# Patient Record
Sex: FEMALE | Race: WHITE | Employment: UNEMPLOYED | URBAN - METROPOLITAN AREA
[De-identification: names, ages, dates, MRNs, and addresses within clinical notes are randomized per-mention and may not be internally consistent; named-entity substitution may affect disease eponyms.]

---

## 2021-11-17 ENCOUNTER — TELEPHONE (OUTPATIENT)
Dept: FAMILY MEDICINE CLINIC | Facility: CLINIC | Age: 36
End: 2021-11-17

## 2021-11-17 ENCOUNTER — OFFICE VISIT (OUTPATIENT)
Dept: FAMILY MEDICINE CLINIC | Facility: CLINIC | Age: 36
End: 2021-11-17

## 2021-11-17 VITALS
HEIGHT: 64 IN | SYSTOLIC BLOOD PRESSURE: 100 MMHG | HEART RATE: 76 BPM | WEIGHT: 133.5 LBS | DIASTOLIC BLOOD PRESSURE: 74 MMHG | RESPIRATION RATE: 16 BRPM | BODY MASS INDEX: 22.79 KG/M2

## 2021-11-17 DIAGNOSIS — Z13.29 SCREENING FOR THYROID DISORDER: ICD-10-CM

## 2021-11-17 DIAGNOSIS — Z13.220 SCREENING FOR LIPID DISORDERS: ICD-10-CM

## 2021-11-17 DIAGNOSIS — Z13.0 SCREENING FOR BLOOD DISEASE: Primary | ICD-10-CM

## 2021-11-17 DIAGNOSIS — Z13.228 SCREENING FOR METABOLIC DISORDER: ICD-10-CM

## 2021-11-17 DIAGNOSIS — L30.9 ECZEMA, UNSPECIFIED TYPE: Primary | ICD-10-CM

## 2021-11-17 PROCEDURE — 3074F SYST BP LT 130 MM HG: CPT | Performed by: NURSE PRACTITIONER

## 2021-11-17 PROCEDURE — 3008F BODY MASS INDEX DOCD: CPT | Performed by: NURSE PRACTITIONER

## 2021-11-17 PROCEDURE — 99203 OFFICE O/P NEW LOW 30 MIN: CPT | Performed by: NURSE PRACTITIONER

## 2021-11-17 PROCEDURE — 3078F DIAST BP <80 MM HG: CPT | Performed by: NURSE PRACTITIONER

## 2021-11-17 RX ORDER — LEVONORGESTREL 52 MG/1
INTRAUTERINE DEVICE INTRAUTERINE
COMMUNITY
Start: 2019-06-26

## 2021-11-17 RX ORDER — VALACYCLOVIR HYDROCHLORIDE 1 G/1
TABLET, FILM COATED ORAL
COMMUNITY
Start: 2019-07-23

## 2021-11-17 NOTE — TELEPHONE ENCOUNTER
Please enter lab orders for the patient's upcoming physical appointment. Physical scheduled:    Your appointments     Date & Time Appointment Department [facility])    Jan 04, 2022 11:00 AM CST Physical - Established with Delio Downs MD [address] [place] [address]

## 2021-11-17 NOTE — PROGRESS NOTES
Georgiana Berrios is a 31year old female who presents as a new patient to establish:       Patient complains of several month history of red, itchy rash to hands, with some serous, sticky drainage. Seems to be spreading.  Denies fever/chills, swelling or pu nasal congestion, sinus pain or tenderness  EYES: denies blurred/double vision or other vision changes, denies discharge from eyes  LUNGS: denies shortness of breath with exertion  CARDIOVASCULAR: denies chest pain on exertion  GI: denies abdominal pain,de

## 2021-11-23 ENCOUNTER — TELEPHONE (OUTPATIENT)
Dept: FAMILY MEDICINE CLINIC | Facility: CLINIC | Age: 36
End: 2021-11-23

## 2021-11-23 NOTE — TELEPHONE ENCOUNTER
Patient completed medical records request form to obtain medical records (pap results, lab results) from previous provider [facility].  Release faxed to [facility] at [phone]

## 2021-12-07 NOTE — TELEPHONE ENCOUNTER
Received medical records from [facility], placed in [address] [place] [address] in box

## 2022-01-04 ENCOUNTER — E-VISIT (OUTPATIENT)
Dept: TELEHEALTH | Age: 37
End: 2022-01-04

## 2022-01-04 DIAGNOSIS — Z20.822 ENCOUNTER BY TELEHEALTH FOR SUSPECTED COVID-19: Primary | ICD-10-CM

## 2022-01-04 PROCEDURE — 99998 NO SHOW: CPT | Performed by: NURSE PRACTITIONER

## 2022-01-04 PROCEDURE — 99421 OL DIG E/M SVC 5-10 MIN: CPT | Performed by: NURSE PRACTITIONER

## 2022-01-04 NOTE — PROGRESS NOTES
Patient elected an E-Visit. After reviewing history, symptoms and ordering lab testing, (7) minutes of time was accrued. Please see E-Visit for further information.

## 2022-03-07 ENCOUNTER — OFFICE VISIT (OUTPATIENT)
Dept: FAMILY MEDICINE CLINIC | Facility: CLINIC | Age: 37
End: 2022-03-07
Payer: COMMERCIAL

## 2022-03-07 VITALS
DIASTOLIC BLOOD PRESSURE: 70 MMHG | BODY MASS INDEX: 23.59 KG/M2 | HEIGHT: 63.5 IN | WEIGHT: 134.81 LBS | HEART RATE: 80 BPM | RESPIRATION RATE: 16 BRPM | SYSTOLIC BLOOD PRESSURE: 102 MMHG | TEMPERATURE: 98 F

## 2022-03-07 DIAGNOSIS — Z11.51 SCREENING FOR HPV (HUMAN PAPILLOMAVIRUS): ICD-10-CM

## 2022-03-07 DIAGNOSIS — Z00.00 ROUTINE GENERAL MEDICAL EXAMINATION AT A HEALTH CARE FACILITY: Primary | ICD-10-CM

## 2022-03-07 DIAGNOSIS — Z12.4 SCREENING FOR CERVICAL CANCER: ICD-10-CM

## 2022-03-07 DIAGNOSIS — L70.0 ACNE VULGARIS: ICD-10-CM

## 2022-03-07 PROCEDURE — 87624 HPV HI-RISK TYP POOLED RSLT: CPT | Performed by: FAMILY MEDICINE

## 2022-03-07 PROCEDURE — 3008F BODY MASS INDEX DOCD: CPT | Performed by: FAMILY MEDICINE

## 2022-03-07 PROCEDURE — 99395 PREV VISIT EST AGE 18-39: CPT | Performed by: FAMILY MEDICINE

## 2022-03-07 PROCEDURE — 3074F SYST BP LT 130 MM HG: CPT | Performed by: FAMILY MEDICINE

## 2022-03-07 PROCEDURE — 3078F DIAST BP <80 MM HG: CPT | Performed by: FAMILY MEDICINE

## 2022-03-07 PROCEDURE — 88175 CYTOPATH C/V AUTO FLUID REDO: CPT | Performed by: FAMILY MEDICINE

## 2022-03-07 RX ORDER — SPIRONOLACTONE 25 MG/1
25 TABLET ORAL DAILY
Qty: 90 TABLET | Refills: 0 | Status: SHIPPED | OUTPATIENT
Start: 2022-03-07

## 2022-03-07 RX ORDER — AZELAIC ACID 0.15 G/G
GEL TOPICAL
Qty: 50 G | Refills: 3 | Status: SHIPPED | OUTPATIENT
Start: 2022-03-07

## 2022-03-07 RX ORDER — CLINDAMYCIN PHOSPHATE 10 MG/G
GEL TOPICAL
Qty: 45 G | Refills: 3 | Status: SHIPPED | OUTPATIENT
Start: 2022-03-07

## 2022-03-08 LAB — HPV I/H RISK 1 DNA SPEC QL NAA+PROBE: NEGATIVE

## 2022-06-03 ENCOUNTER — LAB ENCOUNTER (OUTPATIENT)
Dept: LAB | Age: 37
End: 2022-06-03
Attending: FAMILY MEDICINE
Payer: COMMERCIAL

## 2022-06-03 DIAGNOSIS — Z00.00 ROUTINE GENERAL MEDICAL EXAMINATION AT A HEALTH CARE FACILITY: ICD-10-CM

## 2022-06-03 LAB
ALBUMIN SERPL-MCNC: 4 G/DL (ref 3.4–5)
ALBUMIN/GLOB SERPL: 1.3 {RATIO} (ref 1–2)
ALP LIVER SERPL-CCNC: 43 U/L
ALT SERPL-CCNC: 21 U/L
ANION GAP SERPL CALC-SCNC: 7 MMOL/L (ref 0–18)
AST SERPL-CCNC: 18 U/L (ref 15–37)
BILIRUB SERPL-MCNC: 0.4 MG/DL (ref 0.1–2)
BUN BLD-MCNC: 19 MG/DL (ref 7–18)
CALCIUM BLD-MCNC: 8.6 MG/DL (ref 8.5–10.1)
CHLORIDE SERPL-SCNC: 105 MMOL/L (ref 98–112)
CHOLEST SERPL-MCNC: 171 MG/DL (ref ?–200)
CO2 SERPL-SCNC: 26 MMOL/L (ref 21–32)
CREAT BLD-MCNC: 0.86 MG/DL
ERYTHROCYTE [DISTWIDTH] IN BLOOD BY AUTOMATED COUNT: 12.1 %
FASTING PATIENT LIPID ANSWER: YES
FASTING STATUS PATIENT QL REPORTED: YES
GLOBULIN PLAS-MCNC: 3.2 G/DL (ref 2.8–4.4)
GLUCOSE BLD-MCNC: 76 MG/DL (ref 70–99)
HCT VFR BLD AUTO: 39.9 %
HDLC SERPL-MCNC: 47 MG/DL (ref 40–59)
HGB BLD-MCNC: 13.2 G/DL
LDLC SERPL CALC-MCNC: 107 MG/DL (ref ?–100)
MCH RBC QN AUTO: 30.4 PG (ref 26–34)
MCHC RBC AUTO-ENTMCNC: 33.1 G/DL (ref 31–37)
MCV RBC AUTO: 91.9 FL
NONHDLC SERPL-MCNC: 124 MG/DL (ref ?–130)
OSMOLALITY SERPL CALC.SUM OF ELEC: 287 MOSM/KG (ref 275–295)
PLATELET # BLD AUTO: 165 10(3)UL (ref 150–450)
POTASSIUM SERPL-SCNC: 4.1 MMOL/L (ref 3.5–5.1)
PROT SERPL-MCNC: 7.2 G/DL (ref 6.4–8.2)
RBC # BLD AUTO: 4.34 X10(6)UL
SODIUM SERPL-SCNC: 138 MMOL/L (ref 136–145)
TRIGL SERPL-MCNC: 93 MG/DL (ref 30–149)
TSI SER-ACNC: 1.43 MIU/ML (ref 0.36–3.74)
VIT D+METAB SERPL-MCNC: 41.7 NG/ML (ref 30–100)
VLDLC SERPL CALC-MCNC: 16 MG/DL (ref 0–30)
WBC # BLD AUTO: 6.2 X10(3) UL (ref 4–11)

## 2022-06-03 PROCEDURE — 82306 VITAMIN D 25 HYDROXY: CPT

## 2022-06-03 PROCEDURE — 80061 LIPID PANEL: CPT

## 2022-06-03 PROCEDURE — 36415 COLL VENOUS BLD VENIPUNCTURE: CPT

## 2022-06-03 PROCEDURE — 84443 ASSAY THYROID STIM HORMONE: CPT

## 2022-06-03 PROCEDURE — 85027 COMPLETE CBC AUTOMATED: CPT

## 2022-06-03 PROCEDURE — 80053 COMPREHEN METABOLIC PANEL: CPT

## 2022-06-07 RX ORDER — SPIRONOLACTONE 25 MG/1
TABLET ORAL
Qty: 90 TABLET | Refills: 0 | Status: SHIPPED | OUTPATIENT
Start: 2022-06-07

## 2022-09-05 RX ORDER — SPIRONOLACTONE 25 MG/1
TABLET ORAL
Qty: 90 TABLET | Refills: 0 | Status: SHIPPED | OUTPATIENT
Start: 2022-09-05

## 2022-11-30 RX ORDER — SPIRONOLACTONE 25 MG/1
TABLET ORAL
Qty: 90 TABLET | Refills: 0 | Status: SHIPPED | OUTPATIENT
Start: 2022-11-30

## 2023-02-15 ENCOUNTER — TELEPHONE (OUTPATIENT)
Dept: FAMILY MEDICINE CLINIC | Facility: CLINIC | Age: 38
End: 2023-02-15

## 2023-02-15 DIAGNOSIS — Z00.00 ROUTINE GENERAL MEDICAL EXAMINATION AT A HEALTH CARE FACILITY: Primary | ICD-10-CM

## 2023-02-15 NOTE — TELEPHONE ENCOUNTER
Please enter lab orders for the patient's upcoming physical appointment. Physical scheduled: Your appointments     Date & Time Appointment Department [facility])    May 09, 2023  2:00 PM CDT Physical - Established with Abiel Melendez MD [address], [address], [place] ([address])            Mitesh Munoz [place] [address] [address] [id number]         Preferred lab: [facility])     The patient has been notified to complete fasting labs prior to their physical appointment.

## 2023-03-07 RX ORDER — SPIRONOLACTONE 25 MG/1
TABLET ORAL
Qty: 90 TABLET | Refills: 0 | Status: SHIPPED | OUTPATIENT
Start: 2023-03-07

## 2023-03-27 ENCOUNTER — TELEPHONE (OUTPATIENT)
Dept: FAMILY MEDICINE CLINIC | Facility: CLINIC | Age: 38
End: 2023-03-27

## 2023-03-27 DIAGNOSIS — Z86.19 H/O COLD SORES: Primary | ICD-10-CM

## 2023-03-29 NOTE — TELEPHONE ENCOUNTER
I don't think we  have prescribed this before. I am ok with her taking it and having refill, we just do not know how she has been taking medication. Please clarify directions and then ok to send as verbal.    Thanks!   Henry Don

## 2023-03-31 RX ORDER — VALACYCLOVIR HYDROCHLORIDE 1 G/1
1000 TABLET, FILM COATED ORAL 2 TIMES DAILY PRN
Qty: 20 TABLET | Refills: 0 | Status: SHIPPED | OUTPATIENT
Start: 2023-03-31

## 2023-03-31 NOTE — TELEPHONE ENCOUNTER
Alethea LOCKHART spoke with Pt and Pt states will take the Valcyclovir 1g twice daily as needed for cold sores.    Script sent to [employer]

## 2023-05-06 ENCOUNTER — LAB ENCOUNTER (OUTPATIENT)
Dept: LAB | Age: 38
End: 2023-05-06
Attending: FAMILY MEDICINE
Payer: COMMERCIAL

## 2023-05-06 DIAGNOSIS — Z00.00 ROUTINE GENERAL MEDICAL EXAMINATION AT A HEALTH CARE FACILITY: ICD-10-CM

## 2023-05-06 LAB
ALBUMIN SERPL-MCNC: 3.9 G/DL (ref 3.4–5)
ALBUMIN/GLOB SERPL: 1.1 {RATIO} (ref 1–2)
ALP LIVER SERPL-CCNC: 40 U/L
ALT SERPL-CCNC: 31 U/L
ANION GAP SERPL CALC-SCNC: 5 MMOL/L (ref 0–18)
AST SERPL-CCNC: 26 U/L (ref 15–37)
BILIRUB SERPL-MCNC: 0.5 MG/DL (ref 0.1–2)
BUN BLD-MCNC: 18 MG/DL (ref 7–18)
CALCIUM BLD-MCNC: 9 MG/DL (ref 8.5–10.1)
CHLORIDE SERPL-SCNC: 107 MMOL/L (ref 98–112)
CHOLEST SERPL-MCNC: 175 MG/DL (ref ?–200)
CO2 SERPL-SCNC: 25 MMOL/L (ref 21–32)
CREAT BLD-MCNC: 0.89 MG/DL
ERYTHROCYTE [DISTWIDTH] IN BLOOD BY AUTOMATED COUNT: 12.3 %
FASTING PATIENT LIPID ANSWER: YES
FASTING STATUS PATIENT QL REPORTED: YES
GFR SERPLBLD BASED ON 1.73 SQ M-ARVRAT: 86 ML/MIN/1.73M2 (ref 60–?)
GLOBULIN PLAS-MCNC: 3.5 G/DL (ref 2.8–4.4)
GLUCOSE BLD-MCNC: 87 MG/DL (ref 70–99)
HCT VFR BLD AUTO: 41.3 %
HDLC SERPL-MCNC: 61 MG/DL (ref 40–59)
HGB BLD-MCNC: 13.7 G/DL
LDLC SERPL CALC-MCNC: 105 MG/DL (ref ?–100)
MCH RBC QN AUTO: 30.2 PG (ref 26–34)
MCHC RBC AUTO-ENTMCNC: 33.2 G/DL (ref 31–37)
MCV RBC AUTO: 91 FL
NONHDLC SERPL-MCNC: 114 MG/DL (ref ?–130)
OSMOLALITY SERPL CALC.SUM OF ELEC: 285 MOSM/KG (ref 275–295)
PLATELET # BLD AUTO: 216 10(3)UL (ref 150–450)
POTASSIUM SERPL-SCNC: 4.2 MMOL/L (ref 3.5–5.1)
PROT SERPL-MCNC: 7.4 G/DL (ref 6.4–8.2)
RBC # BLD AUTO: 4.54 X10(6)UL
SODIUM SERPL-SCNC: 137 MMOL/L (ref 136–145)
TRIGL SERPL-MCNC: 45 MG/DL (ref 30–149)
TSI SER-ACNC: 1.42 MIU/ML (ref 0.36–3.74)
VIT D+METAB SERPL-MCNC: 48.7 NG/ML (ref 30–100)
VLDLC SERPL CALC-MCNC: 8 MG/DL (ref 0–30)
WBC # BLD AUTO: 5.4 X10(3) UL (ref 4–11)

## 2023-05-06 PROCEDURE — 82306 VITAMIN D 25 HYDROXY: CPT

## 2023-05-06 PROCEDURE — 84443 ASSAY THYROID STIM HORMONE: CPT

## 2023-05-06 PROCEDURE — 80061 LIPID PANEL: CPT

## 2023-05-06 PROCEDURE — 36415 COLL VENOUS BLD VENIPUNCTURE: CPT

## 2023-05-06 PROCEDURE — 85027 COMPLETE CBC AUTOMATED: CPT

## 2023-05-06 PROCEDURE — 80053 COMPREHEN METABOLIC PANEL: CPT

## 2023-05-09 ENCOUNTER — OFFICE VISIT (OUTPATIENT)
Dept: FAMILY MEDICINE CLINIC | Facility: CLINIC | Age: 38
End: 2023-05-09
Payer: COMMERCIAL

## 2023-05-09 VITALS
TEMPERATURE: 99 F | BODY MASS INDEX: 24.08 KG/M2 | HEART RATE: 94 BPM | RESPIRATION RATE: 16 BRPM | DIASTOLIC BLOOD PRESSURE: 70 MMHG | HEIGHT: 63.5 IN | WEIGHT: 137.63 LBS | SYSTOLIC BLOOD PRESSURE: 110 MMHG

## 2023-05-09 DIAGNOSIS — Z00.00 ROUTINE GENERAL MEDICAL EXAMINATION AT A HEALTH CARE FACILITY: Primary | ICD-10-CM

## 2023-05-09 PROCEDURE — 3074F SYST BP LT 130 MM HG: CPT | Performed by: FAMILY MEDICINE

## 2023-05-09 PROCEDURE — 3078F DIAST BP <80 MM HG: CPT | Performed by: FAMILY MEDICINE

## 2023-05-09 PROCEDURE — 3008F BODY MASS INDEX DOCD: CPT | Performed by: FAMILY MEDICINE

## 2023-05-09 PROCEDURE — 99395 PREV VISIT EST AGE 18-39: CPT | Performed by: FAMILY MEDICINE

## 2023-05-09 RX ORDER — SPIRONOLACTONE 50 MG/1
50 TABLET, FILM COATED ORAL DAILY
Qty: 90 TABLET | Refills: 3 | Status: SHIPPED | OUTPATIENT
Start: 2023-05-09

## 2023-05-09 RX ORDER — CLINDAMYCIN PHOSPHATE 10 MG/G
GEL TOPICAL
Qty: 45 G | Refills: 3 | Status: SHIPPED | OUTPATIENT
Start: 2023-05-09

## 2023-05-09 RX ORDER — TRETINOIN 0.5 MG/G
CREAM TOPICAL
Qty: 45 G | Refills: 3 | Status: SHIPPED | OUTPATIENT
Start: 2023-05-09

## 2023-06-29 ENCOUNTER — TELEPHONE (OUTPATIENT)
Dept: FAMILY MEDICINE CLINIC | Facility: CLINIC | Age: 38
End: 2023-06-29

## 2023-06-29 DIAGNOSIS — Z97.5 IUD (INTRAUTERINE DEVICE) IN PLACE: ICD-10-CM

## 2023-06-29 DIAGNOSIS — Z32.01 POSITIVE PREGNANCY TEST: Primary | ICD-10-CM

## 2023-07-03 ENCOUNTER — OFFICE VISIT (OUTPATIENT)
Facility: CLINIC | Age: 38
End: 2023-07-03
Payer: COMMERCIAL

## 2023-07-03 ENCOUNTER — TELEPHONE (OUTPATIENT)
Facility: CLINIC | Age: 38
End: 2023-07-03

## 2023-07-03 ENCOUNTER — LAB ENCOUNTER (OUTPATIENT)
Dept: LAB | Age: 38
End: 2023-07-03
Attending: OBSTETRICS & GYNECOLOGY
Payer: COMMERCIAL

## 2023-07-03 ENCOUNTER — HOSPITAL ENCOUNTER (OUTPATIENT)
Dept: ULTRASOUND IMAGING | Facility: HOSPITAL | Age: 38
Discharge: HOME | End: 2023-07-03
Attending: OBSTETRICS & GYNECOLOGY
Payer: COMMERCIAL

## 2023-07-03 VITALS
HEIGHT: 63.5 IN | WEIGHT: 137 LBS | DIASTOLIC BLOOD PRESSURE: 68 MMHG | SYSTOLIC BLOOD PRESSURE: 110 MMHG | HEART RATE: 99 BPM | BODY MASS INDEX: 23.97 KG/M2

## 2023-07-03 VITALS
DIASTOLIC BLOOD PRESSURE: 68 MMHG | HEART RATE: 99 BPM | HEIGHT: 63.5 IN | SYSTOLIC BLOOD PRESSURE: 106 MMHG | RESPIRATION RATE: 16 BRPM | WEIGHT: 137 LBS | BODY MASS INDEX: 23.97 KG/M2

## 2023-07-03 DIAGNOSIS — O26.891 RH NEGATIVE STATUS DURING PREGNANCY IN FIRST TRIMESTER: Primary | ICD-10-CM

## 2023-07-03 DIAGNOSIS — O09.891 PREGNANCY OCCURRING WHILE USING INTRAUTERINE CONTRACEPTIVE DEVICE (IUD) IN FIRST TRIMESTER: ICD-10-CM

## 2023-07-03 DIAGNOSIS — O36.80X0 PREGNANCY, LOCATION UNKNOWN: ICD-10-CM

## 2023-07-03 DIAGNOSIS — Z30.432 ENCOUNTER FOR REMOVAL OF INTRAUTERINE CONTRACEPTIVE DEVICE: ICD-10-CM

## 2023-07-03 DIAGNOSIS — O09.891 PREGNANCY OCCURRING WHILE USING INTRAUTERINE CONTRACEPTIVE DEVICE (IUD) IN FIRST TRIMESTER: Primary | ICD-10-CM

## 2023-07-03 DIAGNOSIS — Z67.91 RH NEGATIVE STATUS DURING PREGNANCY IN FIRST TRIMESTER: Primary | ICD-10-CM

## 2023-07-03 LAB
ALBUMIN SERPL-MCNC: 3.8 G/DL (ref 3.4–5)
ALBUMIN/GLOB SERPL: 1.1 {RATIO} (ref 1–2)
ALP LIVER SERPL-CCNC: 41 U/L
ALT SERPL-CCNC: 22 U/L
ANION GAP SERPL CALC-SCNC: 2 MMOL/L (ref 0–18)
ANTIBODY SCREEN: NEGATIVE
AST SERPL-CCNC: 18 U/L (ref 15–37)
B-HCG SERPL-ACNC: 7413 MIU/ML
BASOPHILS # BLD AUTO: 0.04 X10(3) UL (ref 0–0.2)
BASOPHILS NFR BLD AUTO: 0.7 %
BILIRUB SERPL-MCNC: 0.5 MG/DL (ref 0.1–2)
BUN BLD-MCNC: 13 MG/DL (ref 7–18)
CALCIUM BLD-MCNC: 8.7 MG/DL (ref 8.5–10.1)
CHLORIDE SERPL-SCNC: 109 MMOL/L (ref 98–112)
CO2 SERPL-SCNC: 25 MMOL/L (ref 21–32)
CONTROL LINE PRESENT WITH A CLEAR BACKGROUND (YES/NO): YES YES/NO
CREAT BLD-MCNC: 0.83 MG/DL
EOSINOPHIL # BLD AUTO: 0.28 X10(3) UL (ref 0–0.7)
EOSINOPHIL NFR BLD AUTO: 4.8 %
ERYTHROCYTE [DISTWIDTH] IN BLOOD BY AUTOMATED COUNT: 12.3 %
FASTING STATUS PATIENT QL REPORTED: NO
GFR SERPLBLD BASED ON 1.73 SQ M-ARVRAT: 94 ML/MIN/1.73M2 (ref 60–?)
GLOBULIN PLAS-MCNC: 3.4 G/DL (ref 2.8–4.4)
GLUCOSE BLD-MCNC: 88 MG/DL (ref 70–99)
HCT VFR BLD AUTO: 39.4 %
HGB BLD-MCNC: 13.5 G/DL
IMM GRANULOCYTES # BLD AUTO: 0.02 X10(3) UL (ref 0–1)
IMM GRANULOCYTES NFR BLD: 0.3 %
KIT LOT #: NORMAL NUMERIC
LYMPHOCYTES # BLD AUTO: 1.95 X10(3) UL (ref 1–4)
LYMPHOCYTES NFR BLD AUTO: 33.6 %
MCH RBC QN AUTO: 30.3 PG (ref 26–34)
MCHC RBC AUTO-ENTMCNC: 34.3 G/DL (ref 31–37)
MCV RBC AUTO: 88.5 FL
MONOCYTES # BLD AUTO: 0.33 X10(3) UL (ref 0.1–1)
MONOCYTES NFR BLD AUTO: 5.7 %
NEUTROPHILS # BLD AUTO: 3.19 X10 (3) UL (ref 1.5–7.7)
NEUTROPHILS # BLD AUTO: 3.19 X10(3) UL (ref 1.5–7.7)
NEUTROPHILS NFR BLD AUTO: 54.9 %
OSMOLALITY SERPL CALC.SUM OF ELEC: 282 MOSM/KG (ref 275–295)
PLATELET # BLD AUTO: 197 10(3)UL (ref 150–450)
POTASSIUM SERPL-SCNC: 3.6 MMOL/L (ref 3.5–5.1)
PREGNANCY TEST, URINE: YES
PROGEST SERPL-MCNC: 4.25 NG/ML
PROT SERPL-MCNC: 7.2 G/DL (ref 6.4–8.2)
RBC # BLD AUTO: 4.45 X10(6)UL
RH BLOOD TYPE: NEGATIVE
SODIUM SERPL-SCNC: 136 MMOL/L (ref 136–145)
WBC # BLD AUTO: 5.8 X10(3) UL (ref 4–11)

## 2023-07-03 PROCEDURE — 3074F SYST BP LT 130 MM HG: CPT | Performed by: OBSTETRICS & GYNECOLOGY

## 2023-07-03 PROCEDURE — 76801 OB US < 14 WKS SINGLE FETUS: CPT | Performed by: OBSTETRICS & GYNECOLOGY

## 2023-07-03 PROCEDURE — 3008F BODY MASS INDEX DOCD: CPT | Performed by: OBSTETRICS & GYNECOLOGY

## 2023-07-03 PROCEDURE — 36415 COLL VENOUS BLD VENIPUNCTURE: CPT

## 2023-07-03 PROCEDURE — 84702 CHORIONIC GONADOTROPIN TEST: CPT

## 2023-07-03 PROCEDURE — 3078F DIAST BP <80 MM HG: CPT | Performed by: OBSTETRICS & GYNECOLOGY

## 2023-07-03 PROCEDURE — 85025 COMPLETE CBC W/AUTO DIFF WBC: CPT

## 2023-07-03 PROCEDURE — 84144 ASSAY OF PROGESTERONE: CPT

## 2023-07-03 PROCEDURE — 99203 OFFICE O/P NEW LOW 30 MIN: CPT | Performed by: OBSTETRICS & GYNECOLOGY

## 2023-07-03 PROCEDURE — 81025 URINE PREGNANCY TEST: CPT | Performed by: OBSTETRICS & GYNECOLOGY

## 2023-07-03 PROCEDURE — 76817 TRANSVAGINAL US OBSTETRIC: CPT | Performed by: OBSTETRICS & GYNECOLOGY

## 2023-07-03 PROCEDURE — 86900 BLOOD TYPING SEROLOGIC ABO: CPT

## 2023-07-03 PROCEDURE — 86901 BLOOD TYPING SEROLOGIC RH(D): CPT

## 2023-07-03 PROCEDURE — 86850 RBC ANTIBODY SCREEN: CPT

## 2023-07-03 PROCEDURE — 80053 COMPREHEN METABOLIC PANEL: CPT

## 2023-07-03 RX ORDER — PROGESTERONE 200 MG/1
CAPSULE ORAL
Qty: 30 CAPSULE | Refills: 2 | Status: SHIPPED | OUTPATIENT
Start: 2023-07-03

## 2023-07-03 NOTE — PROGRESS NOTES
Reached patient, in store, will call back when in car. Patient returned call, states she viewed message on [employer] and is able to come in today. Call transferred to  for scheduling of IUD removal. Will talk to Dr. Brando Garcia regarding progesterone at today's visit.

## 2023-07-05 ENCOUNTER — LAB ENCOUNTER (OUTPATIENT)
Dept: LAB | Age: 38
End: 2023-07-05
Attending: OBSTETRICS & GYNECOLOGY
Payer: COMMERCIAL

## 2023-07-05 DIAGNOSIS — O09.891 PREGNANCY OCCURRING WHILE USING INTRAUTERINE CONTRACEPTIVE DEVICE (IUD) IN FIRST TRIMESTER: ICD-10-CM

## 2023-07-05 DIAGNOSIS — Z67.91 RH NEGATIVE STATUS DURING PREGNANCY IN FIRST TRIMESTER: ICD-10-CM

## 2023-07-05 DIAGNOSIS — O26.891 RH NEGATIVE STATUS DURING PREGNANCY IN FIRST TRIMESTER: ICD-10-CM

## 2023-07-05 PROBLEM — O02.81 INAPPROPRIATE CHANGE IN QUANTITATIVE HCG IN EARLY PREGNANCY: Status: ACTIVE | Noted: 2023-07-05

## 2023-07-05 PROBLEM — O02.81 INAPPROPRIATE CHANGE IN QUANTITATIVE HCG IN EARLY PREGNANCY (HCC): Status: ACTIVE | Noted: 2023-07-05

## 2023-07-05 LAB — B-HCG SERPL-ACNC: 6219 MIU/ML

## 2023-07-05 PROCEDURE — 84702 CHORIONIC GONADOTROPIN TEST: CPT

## 2023-07-05 PROCEDURE — 36415 COLL VENOUS BLD VENIPUNCTURE: CPT

## 2023-07-05 NOTE — PROGRESS NOTES
Pt aware of results & options for miscarriage. Pt states she had heavy bleeding with clots shortly after the IUD was removed. She had some cramping but subsided within 10-15 minutes. Light bleeding today. Unsure if she passed everything or if this was related to the IUD removal. I let her know I would get a message to Dr. Jimmy Trinidad and call with recommendations. To call with any heavy bleeding or cramping. Verbalized understanding.

## 2023-07-05 NOTE — PROGRESS NOTES
Spoke with pt. She is unsure if she passed tissue. She passed one large clot and a smaller one a little later. Cramping was not intense. I let her know we will repeat an ultrasound over the next few days to see if the pregnancy passed. Aware order has been placed. To call with any questions or concerns. Verbalized understanding.

## 2023-07-07 ENCOUNTER — HOSPITAL ENCOUNTER (OUTPATIENT)
Dept: ULTRASOUND IMAGING | Age: 38
Discharge: HOME | End: 2023-07-07
Attending: OBSTETRICS & GYNECOLOGY
Payer: COMMERCIAL

## 2023-07-07 DIAGNOSIS — O09.891 PREGNANCY OCCURRING WHILE USING INTRAUTERINE CONTRACEPTIVE DEVICE (IUD) IN FIRST TRIMESTER: ICD-10-CM

## 2023-07-07 DIAGNOSIS — O02.81 INAPPROPRIATE CHANGE IN QUANTITATIVE HCG IN EARLY PREGNANCY: ICD-10-CM

## 2023-07-07 PROBLEM — O03.4 INCOMPLETE MISCARRIAGE: Status: ACTIVE | Noted: 2023-07-07

## 2023-07-07 PROBLEM — O03.4 INCOMPLETE MISCARRIAGE (HCC): Status: ACTIVE | Noted: 2023-07-07

## 2023-07-07 PROCEDURE — 76801 OB US < 14 WKS SINGLE FETUS: CPT | Performed by: OBSTETRICS & GYNECOLOGY

## 2023-07-07 PROCEDURE — 76817 TRANSVAGINAL US OBSTETRIC: CPT | Performed by: OBSTETRICS & GYNECOLOGY

## 2023-07-17 ENCOUNTER — TELEPHONE (OUTPATIENT)
Facility: CLINIC | Age: 38
End: 2023-07-17

## 2023-07-18 ENCOUNTER — TELEPHONE (OUTPATIENT)
Dept: OBGYN UNIT | Facility: HOSPITAL | Age: 38
End: 2023-07-18

## 2023-07-18 PROBLEM — Z30.430 ENCOUNTER FOR INSERTION OF MIRENA IUD: Status: ACTIVE | Noted: 2023-07-18

## 2023-07-19 ENCOUNTER — ANESTHESIA (OUTPATIENT)
Dept: SURGERY | Facility: HOSPITAL | Age: 38
End: 2023-07-19
Payer: COMMERCIAL

## 2023-07-19 ENCOUNTER — ANESTHESIA EVENT (OUTPATIENT)
Dept: SURGERY | Facility: HOSPITAL | Age: 38
End: 2023-07-19
Payer: COMMERCIAL

## 2023-07-19 ENCOUNTER — HOSPITAL ENCOUNTER (OUTPATIENT)
Facility: HOSPITAL | Age: 38
Setting detail: HOSPITAL OUTPATIENT SURGERY
Discharge: HOME | End: 2023-07-19
Attending: OBSTETRICS & GYNECOLOGY | Admitting: OBSTETRICS & GYNECOLOGY
Payer: COMMERCIAL

## 2023-07-19 VITALS
SYSTOLIC BLOOD PRESSURE: 108 MMHG | WEIGHT: 137 LBS | HEIGHT: 63.5 IN | OXYGEN SATURATION: 100 % | HEART RATE: 70 BPM | BODY MASS INDEX: 23.97 KG/M2 | TEMPERATURE: 98 F | DIASTOLIC BLOOD PRESSURE: 64 MMHG | RESPIRATION RATE: 16 BRPM

## 2023-07-19 DIAGNOSIS — O02.1 MISSED ABORTION: ICD-10-CM

## 2023-07-19 PROBLEM — Z98.890 STATUS POST DILATION AND CURETTAGE: Status: ACTIVE | Noted: 2023-07-19

## 2023-07-19 LAB
ANTIBODY SCREEN: POSITIVE
RH BLOOD TYPE: NEGATIVE

## 2023-07-19 PROCEDURE — 59812 TREATMENT OF MISCARRIAGE: CPT | Performed by: OBSTETRICS & GYNECOLOGY

## 2023-07-19 PROCEDURE — 0UH97HZ INSERTION OF CONTRACEPTIVE DEVICE INTO UTERUS, VIA NATURAL OR ARTIFICIAL OPENING: ICD-10-PCS | Performed by: OBSTETRICS & GYNECOLOGY

## 2023-07-19 PROCEDURE — 10D17ZZ EXTRACTION OF PRODUCTS OF CONCEPTION, RETAINED, VIA NATURAL OR ARTIFICIAL OPENING: ICD-10-PCS | Performed by: OBSTETRICS & GYNECOLOGY

## 2023-07-19 PROCEDURE — 58300 INSERT INTRAUTERINE DEVICE: CPT | Performed by: OBSTETRICS & GYNECOLOGY

## 2023-07-19 DEVICE — MIRENA IUD: Type: IMPLANTABLE DEVICE | Site: UTERUS | Status: FUNCTIONAL

## 2023-07-19 RX ORDER — SCOLOPAMINE TRANSDERMAL SYSTEM 1 MG/1
1 PATCH, EXTENDED RELEASE TRANSDERMAL ONCE
Status: DISCONTINUED | OUTPATIENT
Start: 2023-07-19 | End: 2023-07-19 | Stop reason: HOSPADM

## 2023-07-19 RX ORDER — SODIUM CHLORIDE, SODIUM LACTATE, POTASSIUM CHLORIDE, CALCIUM CHLORIDE 600; 310; 30; 20 MG/100ML; MG/100ML; MG/100ML; MG/100ML
INJECTION, SOLUTION INTRAVENOUS CONTINUOUS
Status: DISCONTINUED | OUTPATIENT
Start: 2023-07-19 | End: 2023-07-19

## 2023-07-19 RX ORDER — NALOXONE HYDROCHLORIDE 0.4 MG/ML
80 INJECTION, SOLUTION INTRAMUSCULAR; INTRAVENOUS; SUBCUTANEOUS AS NEEDED
Status: DISCONTINUED | OUTPATIENT
Start: 2023-07-19 | End: 2023-07-19

## 2023-07-19 RX ORDER — HYDROMORPHONE HYDROCHLORIDE 1 MG/ML
0.6 INJECTION, SOLUTION INTRAMUSCULAR; INTRAVENOUS; SUBCUTANEOUS EVERY 5 MIN PRN
Status: DISCONTINUED | OUTPATIENT
Start: 2023-07-19 | End: 2023-07-19

## 2023-07-19 RX ORDER — MEPERIDINE HYDROCHLORIDE 25 MG/ML
12.5 INJECTION INTRAMUSCULAR; INTRAVENOUS; SUBCUTANEOUS AS NEEDED
Status: DISCONTINUED | OUTPATIENT
Start: 2023-07-19 | End: 2023-07-19

## 2023-07-19 RX ORDER — ACETAMINOPHEN 500 MG
1000 TABLET ORAL ONCE AS NEEDED
Status: DISCONTINUED | OUTPATIENT
Start: 2023-07-19 | End: 2023-07-19

## 2023-07-19 RX ORDER — LABETALOL HYDROCHLORIDE 5 MG/ML
5 INJECTION, SOLUTION INTRAVENOUS EVERY 5 MIN PRN
Status: DISCONTINUED | OUTPATIENT
Start: 2023-07-19 | End: 2023-07-19

## 2023-07-19 RX ORDER — HYDROCODONE BITARTRATE AND ACETAMINOPHEN 5; 325 MG/1; MG/1
2 TABLET ORAL ONCE AS NEEDED
Status: DISCONTINUED | OUTPATIENT
Start: 2023-07-19 | End: 2023-07-19

## 2023-07-19 RX ORDER — LEVONORGESTREL 52 MG/1
1 INTRAUTERINE DEVICE INTRAUTERINE
Qty: 1 EACH | Refills: 0 | Status: SHIPPED | COMMUNITY
Start: 2023-07-19

## 2023-07-19 RX ORDER — ONDANSETRON 2 MG/ML
4 INJECTION INTRAMUSCULAR; INTRAVENOUS EVERY 6 HOURS PRN
Status: DISCONTINUED | OUTPATIENT
Start: 2023-07-19 | End: 2023-07-19

## 2023-07-19 RX ORDER — ACETAMINOPHEN 500 MG
1000 TABLET ORAL ONCE
Status: DISCONTINUED | OUTPATIENT
Start: 2023-07-19 | End: 2023-07-19 | Stop reason: HOSPADM

## 2023-07-19 RX ORDER — ONDANSETRON 2 MG/ML
INJECTION INTRAMUSCULAR; INTRAVENOUS AS NEEDED
Status: DISCONTINUED | OUTPATIENT
Start: 2023-07-19 | End: 2023-07-19 | Stop reason: SURG

## 2023-07-19 RX ORDER — PROCHLORPERAZINE EDISYLATE 5 MG/ML
5 INJECTION INTRAMUSCULAR; INTRAVENOUS EVERY 8 HOURS PRN
Status: DISCONTINUED | OUTPATIENT
Start: 2023-07-19 | End: 2023-07-19

## 2023-07-19 RX ORDER — KETOROLAC TROMETHAMINE 30 MG/ML
INJECTION, SOLUTION INTRAMUSCULAR; INTRAVENOUS AS NEEDED
Status: DISCONTINUED | OUTPATIENT
Start: 2023-07-19 | End: 2023-07-19 | Stop reason: SURG

## 2023-07-19 RX ORDER — LIDOCAINE HYDROCHLORIDE AND EPINEPHRINE 10; 10 MG/ML; UG/ML
INJECTION, SOLUTION INFILTRATION; PERINEURAL AS NEEDED
Status: DISCONTINUED | OUTPATIENT
Start: 2023-07-19 | End: 2023-07-19 | Stop reason: HOSPADM

## 2023-07-19 RX ORDER — HYDROMORPHONE HYDROCHLORIDE 1 MG/ML
0.4 INJECTION, SOLUTION INTRAMUSCULAR; INTRAVENOUS; SUBCUTANEOUS EVERY 5 MIN PRN
Status: DISCONTINUED | OUTPATIENT
Start: 2023-07-19 | End: 2023-07-19

## 2023-07-19 RX ORDER — MIDAZOLAM HYDROCHLORIDE 1 MG/ML
INJECTION INTRAMUSCULAR; INTRAVENOUS AS NEEDED
Status: DISCONTINUED | OUTPATIENT
Start: 2023-07-19 | End: 2023-07-19 | Stop reason: SURG

## 2023-07-19 RX ORDER — DIPHENHYDRAMINE HYDROCHLORIDE 50 MG/ML
12.5 INJECTION INTRAMUSCULAR; INTRAVENOUS AS NEEDED
Status: DISCONTINUED | OUTPATIENT
Start: 2023-07-19 | End: 2023-07-19

## 2023-07-19 RX ORDER — HYDROMORPHONE HYDROCHLORIDE 1 MG/ML
0.2 INJECTION, SOLUTION INTRAMUSCULAR; INTRAVENOUS; SUBCUTANEOUS EVERY 5 MIN PRN
Status: DISCONTINUED | OUTPATIENT
Start: 2023-07-19 | End: 2023-07-19

## 2023-07-19 RX ORDER — HYDROCODONE BITARTRATE AND ACETAMINOPHEN 5; 325 MG/1; MG/1
1 TABLET ORAL ONCE AS NEEDED
Status: DISCONTINUED | OUTPATIENT
Start: 2023-07-19 | End: 2023-07-19

## 2023-07-19 RX ADMIN — KETOROLAC TROMETHAMINE 30 MG: 30 INJECTION, SOLUTION INTRAMUSCULAR; INTRAVENOUS at 14:19:00

## 2023-07-19 RX ADMIN — SODIUM CHLORIDE, SODIUM LACTATE, POTASSIUM CHLORIDE, CALCIUM CHLORIDE: 600; 310; 30; 20 INJECTION, SOLUTION INTRAVENOUS at 14:03:00

## 2023-07-19 RX ADMIN — MIDAZOLAM HYDROCHLORIDE 2 MG: 1 INJECTION INTRAMUSCULAR; INTRAVENOUS at 14:03:00

## 2023-07-19 RX ADMIN — ONDANSETRON 4 MG: 2 INJECTION INTRAMUSCULAR; INTRAVENOUS at 14:03:00

## 2023-07-19 NOTE — DISCHARGE INSTRUCTIONS
Nothing in the vagina for 2 weeks. No strenuous activity/exercise for 48 hours (it can increase bleeding from the uterus.)   Please still take frequent walks. Stairs are fine. No tub baths for 2 weeks. May shower. If trying to conceive again in near future:  -Please do not attempt to try until after the first normal period returns.  Can take up to 6-8 weeks.  -Continue folic acid and DHA    Please call office if:  -fever 100.4 or higher    Please proceed to the Emergency Department at [facility] for any of the following:   -vaginal bleeding soaking greater than 1 pad per hour  -severe pelvic pain  -shortness of breath  -chest pain  -leg pain or swelling

## 2023-07-19 NOTE — ANESTHESIA POSTPROCEDURE EVALUATION
[facility] Patient Status:  Hospital Outpatient Surgery   Age/Gender 41year old female MRN [id number]   Location [address] [place] [address] Attending Benny Calderon MD   [facility] Day # 0 PCP Akbar Rehman MD       Anesthesia Post-op Note    DILATION & CURETTAGE SUCTION, INSERTION OF MIRENA INTRAUTERINE DEVICE    Procedure Summary       Date: 23 Room / Location: [facility] OR [facility] OR    Anesthesia Start:  Anesthesia Stop:     Procedure: [address], INSERTION OF MIRENA INTRAUTERINE DEVICE (Vagina ) Diagnosis:       Missed       (Missed  [O02.1])    Surgeons: Benny Calderon MD Anesthesiologist: Dwight Kennedy MD    Anesthesia Type: MAC ASA Status: 2            Anesthesia Type: MAC    Vitals Value Taken Time   /64 23 1445   Temp 98 23 1445   Pulse 68 23 1445   Resp 18 23 1445   SpO2 100 % 23 1445   Vitals shown include unvalidated device data. Patient Location: Same Day Surgery    Anesthesia Type: MAC    Airway Patency: patent    Postop Pain Control: adequate    Mental Status: preanesthetic baseline    Nausea/Vomiting: none    Cardiopulmonary/Hydration status: stable euvolemic    Complications: no apparent anesthesia related complications    Postop vital signs: stable    Dental Exam: Unchanged from Preop    Patient to be discharged home when criteria met.

## 2023-07-19 NOTE — OPERATIVE REPORT
OPERATIVE REPORT:     PATIENT: Summer Michael   MRN: [id number]    DATE OF PROCEDURE: 2023     INDICATIONS:   41year old  at unknown gestational age was diagnosed with early pregnancy that occurred while Mirena IUD was in place since 2019. Patient does not get regular periods since her IUD has been in place. She had some odd bleeding though and that made her take a pregnancy test, which was positive. HCG 7,413 with progesterone only 4.25. Pelvic US showed a small gestational sac containing a yolk sac but no fetal pole in the fundus of the uterus and the Mirena IUD was in correct position. The patient was counseled regarding risk of possible miscarriage but also of increased poor pregnancy outcomes with retaining the IUD so she did elect to remove the intrauterine device. She then had some bleeding for 2 days. HCG level decreased. Repeat pelvic ultrasound with probable gestational sac but no yolk sac visible anymore. She was counseled regarding options and was possibly going to wait to see if she would pass tissue on her own but ultimately decided to have suction dilation and curettage for management. She did have some bleeding last night but like a period. Not very heavy. Thinks maybe passed some clot or tissue. Recommended we still proceed with suction D&C today and she also desired insertion of Mirena IUD. PRE-OP DIAGNOSIS:   Incomplete early spontaneous  at unknown gestational age  Request of insertion of Mirena intrauterine device     POST-OP DIAGNOSIS:   Incomplete early spontaneous , status post suction D&C  Status post insertion of Mirena intrauterine device    PROCEDURE(S):   Suction dilatation and curettage of uterus   Insertion of Mirena intrauterine device     ANESTHESIA: MAC, paracervical block   ANTIBIOTICS: IV Doxycycline   Other medications: Of note, patient is Rh negative and did receive a dose of Rhogam on 7/3/23 in our clinic.      SURGEON(S):Shayla Boston, MD     ESTIMATED BLOOD LOSS 5 mL            DRAINS: None           SPECIMENS: Products of conception            IMPLANTS: Mirena intrauterine device            COMPLICATIONS:  None apparent     FINDINGS: Normal external genitalia, no lesions. Parous cervix. Small dark bloody mucus at external cervical os. Retroverted uterus. Cervix allowed #8 Hegar dilator to pass relatively easily. Small amount of products of conception. Uterus sounded 8 cm. PROCEDURE: This procedure was fully reviewed with the patient and written informed consent was obtained after discussing risks, benefits, indication and alternatives. All questions were answered. The patient was taken to operative room. SCDs and IV antibiotics were administered. Anesthesia was administered. She was placed in dorsal lithotomy position. She was prepped and draped in normal sterile fashion. Bladder was not drained. A bi-valved speculum was placed in the vagina. Paracervical block was administered. A single tooth tenaculum was used to grasp the posterior lip of the cervix. The cervix was gently dilated with Hegar dilators to 8 mm. An 8 mm curved suction curette was then gently advanced into the uterine cavity and the vacuum was activated. Small amount of tissue was noted. This was repeated a few more times with decreasing to minimal tissue noted for each pass. A Mirena intrauterine device was prepped and inserted into the uterus according to standard procedure. Strings were trimmed to about 4 cm. The procedure then concluded. The specimen was sent to pathology. The single tooth tenaculum was removed. Good hemostasis was noted. All instruments were then removed from the vagina. Sponge, lap, needle, and instrument counts correct by two counts. The patient was taken to recovery room in stable condition.      DISPOSITION:  To recovery room in stable condition        CONDITION: Stable      Leon Wood MD  [facility] - OBGYN

## 2023-07-19 NOTE — INTERVAL H&P NOTE
Pre-op Diagnosis: Missed  [O02.1]    The above referenced H&P was reviewed by Aaron Robbins MD on 2023, the patient was examined and no significant changes have occurred in the patient's condition since the H&P was performed. I discussed with the patient and/or legal representative the potential benefits, risks and side effects of this procedure; the likelihood of the patient achieving goals; and potential problems that might occur during recuperation. I discussed reasonable alternatives to the procedure, including risks, benefits and side effects related to the alternatives and risks related to not receiving this procedure. We will proceed with procedure as planned.

## 2023-08-22 ENCOUNTER — OFFICE VISIT (OUTPATIENT)
Facility: CLINIC | Age: 38
End: 2023-08-22
Payer: COMMERCIAL

## 2023-08-22 VITALS
BODY MASS INDEX: 24.47 KG/M2 | HEIGHT: 63.5 IN | WEIGHT: 139.81 LBS | HEART RATE: 89 BPM | DIASTOLIC BLOOD PRESSURE: 66 MMHG | SYSTOLIC BLOOD PRESSURE: 104 MMHG

## 2023-08-22 DIAGNOSIS — Z30.431 IUD CHECK UP: Primary | ICD-10-CM

## 2023-08-22 PROCEDURE — 3074F SYST BP LT 130 MM HG: CPT | Performed by: OBSTETRICS & GYNECOLOGY

## 2023-08-22 PROCEDURE — 3078F DIAST BP <80 MM HG: CPT | Performed by: OBSTETRICS & GYNECOLOGY

## 2023-08-22 PROCEDURE — 3008F BODY MASS INDEX DOCD: CPT | Performed by: OBSTETRICS & GYNECOLOGY

## 2023-08-22 PROCEDURE — 99024 POSTOP FOLLOW-UP VISIT: CPT | Performed by: OBSTETRICS & GYNECOLOGY

## 2023-08-22 RX ORDER — SPIRONOLACTONE 50 MG/1
TABLET, FILM COATED ORAL
COMMUNITY
Start: 2023-08-11

## 2023-11-26 DIAGNOSIS — Z86.19 H/O COLD SORES: ICD-10-CM

## 2023-11-27 RX ORDER — VALACYCLOVIR HYDROCHLORIDE 1 G/1
1000 TABLET, FILM COATED ORAL 2 TIMES DAILY PRN
Qty: 20 TABLET | Refills: 0 | Status: SHIPPED | OUTPATIENT
Start: 2023-11-27

## 2024-04-12 ENCOUNTER — TELEPHONE (OUTPATIENT)
Dept: FAMILY MEDICINE CLINIC | Facility: CLINIC | Age: 39
End: 2024-04-12

## 2024-04-12 DIAGNOSIS — Z00.00 ROUTINE GENERAL MEDICAL EXAMINATION AT A HEALTH CARE FACILITY: Primary | ICD-10-CM

## 2024-04-12 NOTE — TELEPHONE ENCOUNTER
Please enter lab orders for the patient's upcoming physical appointment.     Physical scheduled:   Your appointments       Date & Time Appointment Department ([facility])    May 10, 2024 9:30 AM CDT Physical - Established with Tess Colunga MD [facility] ([facility])              [facility]  [address]  [facility] [address]  [phone]           Preferred lab: [facility] ([facility])     The patient has been notified to complete fasting labs prior to their physical appointment.

## 2024-04-22 ENCOUNTER — LAB ENCOUNTER (OUTPATIENT)
Dept: LAB | Age: 39
End: 2024-04-22
Attending: FAMILY MEDICINE
Payer: COMMERCIAL

## 2024-04-22 DIAGNOSIS — Z00.00 ROUTINE GENERAL MEDICAL EXAMINATION AT A HEALTH CARE FACILITY: ICD-10-CM

## 2024-04-22 LAB
ALBUMIN SERPL-MCNC: 4 G/DL (ref 3.4–5)
ALBUMIN/GLOB SERPL: 1.1 {RATIO} (ref 1–2)
ALP LIVER SERPL-CCNC: 49 U/L
ALT SERPL-CCNC: 23 U/L
ANION GAP SERPL CALC-SCNC: 6 MMOL/L (ref 0–18)
AST SERPL-CCNC: 23 U/L (ref 15–37)
BILIRUB SERPL-MCNC: 0.4 MG/DL (ref 0.1–2)
BUN BLD-MCNC: 14 MG/DL (ref 9–23)
CALCIUM BLD-MCNC: 9 MG/DL (ref 8.5–10.1)
CHLORIDE SERPL-SCNC: 104 MMOL/L (ref 98–112)
CHOLEST SERPL-MCNC: 191 MG/DL (ref ?–200)
CO2 SERPL-SCNC: 27 MMOL/L (ref 21–32)
CREAT BLD-MCNC: 0.98 MG/DL
EGFRCR SERPLBLD CKD-EPI 2021: 76 ML/MIN/1.73M2 (ref 60–?)
ERYTHROCYTE [DISTWIDTH] IN BLOOD BY AUTOMATED COUNT: 11.9 %
FASTING PATIENT LIPID ANSWER: YES
FASTING STATUS PATIENT QL REPORTED: YES
GLOBULIN PLAS-MCNC: 3.8 G/DL (ref 2.8–4.4)
GLUCOSE BLD-MCNC: 89 MG/DL (ref 70–99)
HCT VFR BLD AUTO: 41.9 %
HDLC SERPL-MCNC: 58 MG/DL (ref 40–59)
HGB BLD-MCNC: 14.1 G/DL
LDLC SERPL CALC-MCNC: 124 MG/DL (ref ?–100)
MCH RBC QN AUTO: 30.6 PG (ref 26–34)
MCHC RBC AUTO-ENTMCNC: 33.7 G/DL (ref 31–37)
MCV RBC AUTO: 90.9 FL
NONHDLC SERPL-MCNC: 133 MG/DL (ref ?–130)
OSMOLALITY SERPL CALC.SUM OF ELEC: 284 MOSM/KG (ref 275–295)
PLATELET # BLD AUTO: 235 10(3)UL (ref 150–450)
POTASSIUM SERPL-SCNC: 3.7 MMOL/L (ref 3.5–5.1)
PROT SERPL-MCNC: 7.8 G/DL (ref 6.4–8.2)
RBC # BLD AUTO: 4.61 X10(6)UL
SODIUM SERPL-SCNC: 137 MMOL/L (ref 136–145)
TRIGL SERPL-MCNC: 48 MG/DL (ref 30–149)
TSI SER-ACNC: 1.58 MIU/ML (ref 0.36–3.74)
VIT D+METAB SERPL-MCNC: 48.4 NG/ML (ref 30–100)
VLDLC SERPL CALC-MCNC: 8 MG/DL (ref 0–30)
WBC # BLD AUTO: 6.2 X10(3) UL (ref 4–11)

## 2024-04-22 PROCEDURE — 80053 COMPREHEN METABOLIC PANEL: CPT

## 2024-04-22 PROCEDURE — 82306 VITAMIN D 25 HYDROXY: CPT

## 2024-04-22 PROCEDURE — 85027 COMPLETE CBC AUTOMATED: CPT

## 2024-04-22 PROCEDURE — 84443 ASSAY THYROID STIM HORMONE: CPT

## 2024-04-22 PROCEDURE — 80061 LIPID PANEL: CPT

## 2024-04-22 PROCEDURE — 36415 COLL VENOUS BLD VENIPUNCTURE: CPT

## 2024-05-10 ENCOUNTER — OFFICE VISIT (OUTPATIENT)
Dept: FAMILY MEDICINE CLINIC | Facility: CLINIC | Age: 39
End: 2024-05-10
Payer: COMMERCIAL

## 2024-05-10 VITALS
HEIGHT: 63.5 IN | BODY MASS INDEX: 25.06 KG/M2 | DIASTOLIC BLOOD PRESSURE: 70 MMHG | HEART RATE: 88 BPM | WEIGHT: 143.19 LBS | RESPIRATION RATE: 16 BRPM | SYSTOLIC BLOOD PRESSURE: 104 MMHG | TEMPERATURE: 98 F

## 2024-05-10 DIAGNOSIS — Z12.83 SKIN CANCER SCREENING: ICD-10-CM

## 2024-05-10 DIAGNOSIS — Z86.19 H/O COLD SORES: ICD-10-CM

## 2024-05-10 DIAGNOSIS — Z00.00 ROUTINE GENERAL MEDICAL EXAMINATION AT A HEALTH CARE FACILITY: Primary | ICD-10-CM

## 2024-05-10 PROBLEM — O02.81 INAPPROPRIATE CHANGE IN QUANTITATIVE HCG IN EARLY PREGNANCY (HCC): Status: RESOLVED | Noted: 2023-07-05 | Resolved: 2024-05-10

## 2024-05-10 PROBLEM — Z98.890 STATUS POST DILATION AND CURETTAGE: Status: RESOLVED | Noted: 2023-07-19 | Resolved: 2024-05-10

## 2024-05-10 PROBLEM — O03.4 INCOMPLETE MISCARRIAGE (HCC): Status: RESOLVED | Noted: 2023-07-07 | Resolved: 2024-05-10

## 2024-05-10 PROBLEM — O09.891: Status: RESOLVED | Noted: 2023-07-03 | Resolved: 2024-05-10

## 2024-05-10 PROBLEM — Z30.430 ENCOUNTER FOR INSERTION OF MIRENA IUD: Status: RESOLVED | Noted: 2023-07-18 | Resolved: 2024-05-10

## 2024-05-10 PROCEDURE — 3008F BODY MASS INDEX DOCD: CPT | Performed by: FAMILY MEDICINE

## 2024-05-10 PROCEDURE — 3074F SYST BP LT 130 MM HG: CPT | Performed by: FAMILY MEDICINE

## 2024-05-10 PROCEDURE — 3078F DIAST BP <80 MM HG: CPT | Performed by: FAMILY MEDICINE

## 2024-05-10 PROCEDURE — 99395 PREV VISIT EST AGE 18-39: CPT | Performed by: FAMILY MEDICINE

## 2024-05-10 RX ORDER — VALACYCLOVIR HYDROCHLORIDE 1 G/1
1000 TABLET, FILM COATED ORAL 2 TIMES DAILY PRN
Qty: 20 TABLET | Refills: 0 | Status: SHIPPED | OUTPATIENT
Start: 2024-05-10

## 2024-05-10 RX ORDER — MAGNESIUM GLYCINATE 100 MG
120 CAPSULE ORAL
COMMUNITY
Start: 2023-11-03

## 2024-05-10 RX ORDER — CLINDAMYCIN PHOSPHATE 10 MG/G
GEL TOPICAL
Qty: 45 G | Refills: 3 | Status: SHIPPED | OUTPATIENT
Start: 2024-05-10

## 2024-05-10 RX ORDER — SPIRONOLACTONE 50 MG/1
50 TABLET, FILM COATED ORAL DAILY
Qty: 90 TABLET | Refills: 3 | Status: SHIPPED | OUTPATIENT
Start: 2024-05-10

## 2024-05-10 NOTE — PROGRESS NOTES
HPI:   Corrie Reeves is a 39 year old female who presents for a complete physical exam.  Last pap: 3/7/22  Last mammogram:  /   Menses:  Rare due to IUD  Contraception:  Mirena IUD 2023  Previous colonoscopy:  n/a  Previous DEXA:  /.  Current or previous treatment:  /  Family hx of breast, ovarian, cervical or colon CA:  Mat aunt breast  Immunizations:  Tdap:  3/2019, Flu:  yearly, Shingles:  /, COVID:  yes  Occ: Homemaker, : yes. Children: son and daughter.     , other labs acceptable.    Hormonal acne:  Previously seeing derm - would like to return for skin screen.  Oral bactrim in her 20s then oral doxy.  Took  azelaic acid, topical clinda during pregnancy/breastfeeding.    Controlled with spironolactone.  Using topical clinda prn and retin A twice weekly    Wt Readings from Last 6 Encounters:   05/10/24 143 lb 3.2 oz (65 kg)   08/22/23 139 lb 12.8 oz (63.4 kg)   07/19/23 137 lb (62.1 kg)   07/03/23 137 lb (62.1 kg)   07/03/23 137 lb (62.1 kg)   05/09/23 137 lb 9.6 oz (62.4 kg)     Body mass index is 24.97 kg/m².     Cholesterol, Total (mg/dL)   Date Value   04/22/2024 191   05/06/2023 175   06/03/2022 171     HDL Cholesterol (mg/dL)   Date Value   04/22/2024 58   05/06/2023 61 (H)   06/03/2022 47     LDL Cholesterol (mg/dL)   Date Value   04/22/2024 124 (H)   05/06/2023 105 (H)   06/03/2022 107 (H)        Current Outpatient Medications   Medication Sig Dispense Refill    Magnesium Glycinate 100 MG Oral Cap 120 mg.      Clindamycin Phosphate 1 % External Gel Use twice daily to face as directed 45 g 3    valACYclovir 1 G Oral Tab Take 1 tablet (1,000 mg total) by mouth 2 (two) times daily as needed (cold sores). 20 tablet 0    spironolactone 50 MG Oral Tab Take 1 tablet (50 mg total) by mouth daily. 90 tablet 3    Levonorgestrel (MIRENA, 52 MG,) 20 MCG/DAY Intrauterine IUD 20 mcg (1 each total) by Intrauterine route. 1 each 0    hydrocortisone 2.5 % External Ointment Apply 1 Application  topically 2 (two) times daily. (Patient taking differently: Apply 1 Application  topically 2 (two) times daily as needed.) 20 g 0      Patient Active Problem List   Diagnosis    Eczema    Acne vulgaris     Past Medical History:    Abnormal Pap smear of cervix    colposcopy    Abnormal uterine bleeding    Acne    H/o hormonal acne - Hormonal acne:  Previously seeing derm.  Oral bactrim in her 20s then oral doxy.  Took  azelaic acid, topical clinda during pregnancy/breastfeeding. Started spironolactone 3/2022.  Sx improved. Also using topical clinda.  Will add retin A    Acne vulgaris    Anxiety    Concussion    Eczema    Herpes simplex    cold sores    Hx of motion sickness    Screening breast examination    PCP Dr. Tess Colunga    Visual impairment    contacts/glasses      Past Surgical History:   Procedure Laterality Date    Colonoscopy  02/2010    Negative. Was having significant nausea, irregular bowel habits. Thinks it was anxiety.    Colposcopy, cervix w/upper adjacent vagina; w/biopsy(s), cervix  2012    Egd  02/2020    Negative. Done for nausea, change in bowel habits    Insert intrauterine device  07/2019 June or July 2019. Mirena IUD    Insert intrauterine device  07/19/2023    Mirena IUD inserted at time of suction D&C for incomplete SAB. Retroverted. Sounded 8 cm. Dr. Shayla Boston    Remove intrauterine device  07/03/2023    Mirena IUD removed due to early IUP conceived while IUD in correct position by ultrasound. Dr. Shayla Boston    Surg rx incomplete abortn  07/19/2023    Suction D&C for early incomplete SAB. Dr. Shayla Boston, [facility]    Tonsillectomy  1990    & adenoids      Family History   Problem Relation Age of Onset    Hyperlipidemia Mother     Other (obesity) Mother     Hyperlipidemia Father     Lipids Father     Obesity Father     Lipids Maternal Grandmother     Prostate Cancer Maternal Grandfather     Hypertension Paternal Grandmother     Obesity Paternal Grandmother      Kidney Disease Paternal Grandfather     Breast Cancer Maternal Aunt         late 60s    Ovarian Cancer Neg     Colon Cancer Neg       Social History:   Social History     Socioeconomic History    Marital status:    Tobacco Use    Smoking status: Never    Smokeless tobacco: Never   Vaping Use    Vaping status: Never Used   Substance and Sexual Activity    Alcohol use: Yes     Alcohol/week: 1.0 standard drink of alcohol     Types: 1 Standard drinks or equivalent per week     Comment: 1-2 drinks a month    Drug use: Never    Sexual activity: Yes     Partners: Male     Birth control/protection: I.U.D., Mirena     Comment: Mirena IUD inserted 7/19/23   Other Topics Concern    Caffeine Concern Yes     Comment: 1  cups    Exercise Yes     Comment: 1-2 a week    Self-Exams No        REVIEW OF SYSTEMS:   GENERAL: feels well otherwise  LUNGS: denies shortness of breath  CARDIOVASCULAR: denies chest pain  GI: denies abdominal pain,  No constipation or diarrhea  : denies dysuria, vaginal discharge or itching    EXAM:   /70   Pulse 88   Temp 97.5 °F (36.4 °C)   Resp 16   Ht 5' 3.5\" (1.613 m)   Wt 143 lb 3.2 oz (65 kg)   LMP 06/13/2023 (Exact Date)   BMI 24.97 kg/m²   Body mass index is 24.97 kg/m².   GENERAL: well developed, well nourished,in no apparent distress  HEENT: atraumatic, normocephalic, TMs normal  EYES:PERRLA, EOMI,conjunctiva are clear  NECK: supple,no adenopathy, no thyromegaly  BREAST: no dominant or suspicious mass, no nipple discharge  LUNGS: clear to auscultation  CARDIO: RRR without murmur  GI: good BS's,no masses, HSM or tenderness  :/  EXTREMITIES: no edema    ASSESSMENT AND PLAN:   Corrie Reeves is a 39 year old female who presents for a complete physical exam.  Encounter Diagnoses   Name Primary?    Routine general medical examination at a health care facility Yes    H/O cold sores     Skin cancer screening      Pap:  UTD  Mammogram:  age 51.    Dexascan:  /.  Labs:   reviewed  Colonoscopy:  age 47  Vaccines:  /  Recommended low fat diet and regular exercise.    The patient is asked to return for CPX in 1 year.  No orders of the defined types were placed in this encounter.      Meds & Refills for this Visit:  Requested Prescriptions     Signed Prescriptions Disp Refills    Clindamycin Phosphate 1 % External Gel 45 g 3     Sig: Use twice daily to face as directed    valACYclovir 1 G Oral Tab 20 tablet 0     Sig: Take 1 tablet (1,000 mg total) by mouth 2 (two) times daily as needed (cold sores).    spironolactone 50 MG Oral Tab 90 tablet 3     Sig: Take 1 tablet (50 mg total) by mouth daily.       Imaging & Consults:  DERM - INTERNAL

## 2024-05-20 RX ORDER — SPIRONOLACTONE 50 MG/1
50 TABLET, FILM COATED ORAL DAILY
Qty: 90 TABLET | Refills: 3 | OUTPATIENT
Start: 2024-05-20

## 2024-05-20 NOTE — TELEPHONE ENCOUNTER
Duplicate Rx request  Med refilled on 5/10/24 for 90 tablet, 3 refills to [facility]

## 2025-04-17 ENCOUNTER — TELEPHONE (OUTPATIENT)
Dept: FAMILY MEDICINE CLINIC | Facility: CLINIC | Age: 40
End: 2025-04-17

## 2025-04-17 DIAGNOSIS — Z00.00 ROUTINE GENERAL MEDICAL EXAMINATION AT A HEALTH CARE FACILITY: Primary | ICD-10-CM

## 2025-04-17 NOTE — TELEPHONE ENCOUNTER
Please enter lab orders for the patient's upcoming physical appointment.     Physical scheduled:   Your appointments       Date & Time Appointment Department ([facility])    May 12, 2025 9:00 AM CDT Physical - Established with Tess Colunga MD [facility] ([facility])              [facility]  [address]  [facility] [address]  [phone]           Preferred lab: [facility] ([facility])     The patient has been notified to complete fasting labs prior to their physical appointment.

## 2025-05-06 ENCOUNTER — LAB ENCOUNTER (OUTPATIENT)
Dept: LAB | Age: 40
End: 2025-05-06
Attending: FAMILY MEDICINE
Payer: COMMERCIAL

## 2025-05-06 DIAGNOSIS — Z00.00 ROUTINE GENERAL MEDICAL EXAMINATION AT A HEALTH CARE FACILITY: ICD-10-CM

## 2025-05-06 LAB
ALBUMIN SERPL-MCNC: 4.6 G/DL (ref 3.2–4.8)
ALBUMIN/GLOB SERPL: 1.7 {RATIO} (ref 1–2)
ALP LIVER SERPL-CCNC: 48 U/L (ref 37–98)
ALT SERPL-CCNC: 29 U/L (ref 10–49)
ANION GAP SERPL CALC-SCNC: 11 MMOL/L (ref 0–18)
AST SERPL-CCNC: 25 U/L (ref ?–34)
BILIRUB SERPL-MCNC: 0.4 MG/DL (ref 0.3–1.2)
BUN BLD-MCNC: 13 MG/DL (ref 9–23)
CALCIUM BLD-MCNC: 9.5 MG/DL (ref 8.7–10.6)
CHLORIDE SERPL-SCNC: 104 MMOL/L (ref 98–112)
CHOLEST SERPL-MCNC: 169 MG/DL (ref ?–200)
CO2 SERPL-SCNC: 24 MMOL/L (ref 21–32)
CREAT BLD-MCNC: 0.96 MG/DL (ref 0.55–1.02)
EGFRCR SERPLBLD CKD-EPI 2021: 78 ML/MIN/1.73M2 (ref 60–?)
ERYTHROCYTE [DISTWIDTH] IN BLOOD BY AUTOMATED COUNT: 12.5 %
FASTING PATIENT LIPID ANSWER: YES
FASTING STATUS PATIENT QL REPORTED: YES
GLOBULIN PLAS-MCNC: 2.7 G/DL (ref 2–3.5)
GLUCOSE BLD-MCNC: 86 MG/DL (ref 70–99)
HCT VFR BLD AUTO: 41.9 % (ref 35–48)
HDLC SERPL-MCNC: 50 MG/DL (ref 40–59)
HGB BLD-MCNC: 14.1 G/DL (ref 12–16)
LDLC SERPL CALC-MCNC: 106 MG/DL (ref ?–100)
MCH RBC QN AUTO: 30.7 PG (ref 26–34)
MCHC RBC AUTO-ENTMCNC: 33.7 G/DL (ref 31–37)
MCV RBC AUTO: 91.1 FL (ref 80–100)
NONHDLC SERPL-MCNC: 119 MG/DL (ref ?–130)
OSMOLALITY SERPL CALC.SUM OF ELEC: 287 MOSM/KG (ref 275–295)
PLATELET # BLD AUTO: 236 10(3)UL (ref 150–450)
POTASSIUM SERPL-SCNC: 3.8 MMOL/L (ref 3.5–5.1)
PROT SERPL-MCNC: 7.3 G/DL (ref 5.7–8.2)
RBC # BLD AUTO: 4.6 X10(6)UL (ref 3.8–5.3)
SODIUM SERPL-SCNC: 139 MMOL/L (ref 136–145)
TRIGL SERPL-MCNC: 69 MG/DL (ref 30–149)
TSI SER-ACNC: 1.63 UIU/ML (ref 0.55–4.78)
VIT D+METAB SERPL-MCNC: 49.9 NG/ML (ref 30–100)
VLDLC SERPL CALC-MCNC: 12 MG/DL (ref 0–30)
WBC # BLD AUTO: 6.8 X10(3) UL (ref 4–11)

## 2025-05-06 PROCEDURE — 84443 ASSAY THYROID STIM HORMONE: CPT

## 2025-05-06 PROCEDURE — 85027 COMPLETE CBC AUTOMATED: CPT

## 2025-05-06 PROCEDURE — 82306 VITAMIN D 25 HYDROXY: CPT

## 2025-05-06 PROCEDURE — 80061 LIPID PANEL: CPT

## 2025-05-06 PROCEDURE — 80053 COMPREHEN METABOLIC PANEL: CPT

## 2025-05-06 PROCEDURE — 36415 COLL VENOUS BLD VENIPUNCTURE: CPT

## 2025-05-12 ENCOUNTER — OFFICE VISIT (OUTPATIENT)
Dept: FAMILY MEDICINE CLINIC | Facility: CLINIC | Age: 40
End: 2025-05-12
Payer: COMMERCIAL

## 2025-05-12 VITALS
SYSTOLIC BLOOD PRESSURE: 94 MMHG | RESPIRATION RATE: 16 BRPM | HEIGHT: 63.47 IN | DIASTOLIC BLOOD PRESSURE: 58 MMHG | WEIGHT: 148 LBS | BODY MASS INDEX: 25.9 KG/M2 | TEMPERATURE: 98 F | HEART RATE: 98 BPM

## 2025-05-12 DIAGNOSIS — Z00.00 ROUTINE GENERAL MEDICAL EXAMINATION AT A HEALTH CARE FACILITY: Primary | ICD-10-CM

## 2025-05-12 DIAGNOSIS — Z11.51 SCREENING FOR HUMAN PAPILLOMAVIRUS (HPV): ICD-10-CM

## 2025-05-12 DIAGNOSIS — Z86.19 H/O COLD SORES: ICD-10-CM

## 2025-05-12 DIAGNOSIS — Z12.4 SCREENING FOR CERVICAL CANCER: ICD-10-CM

## 2025-05-12 PROCEDURE — 88175 CYTOPATH C/V AUTO FLUID REDO: CPT | Performed by: FAMILY MEDICINE

## 2025-05-12 PROCEDURE — 87624 HPV HI-RISK TYP POOLED RSLT: CPT | Performed by: FAMILY MEDICINE

## 2025-05-12 RX ORDER — LORATADINE 10 MG/1
CAPSULE, LIQUID FILLED ORAL
COMMUNITY

## 2025-05-12 RX ORDER — CHOLECALCIFEROL (VITAMIN D3) 50 MCG
1 TABLET ORAL DAILY
COMMUNITY

## 2025-05-12 RX ORDER — VALACYCLOVIR HYDROCHLORIDE 1 G/1
1000 TABLET, FILM COATED ORAL 2 TIMES DAILY PRN
Qty: 20 TABLET | Refills: 0 | Status: SHIPPED | OUTPATIENT
Start: 2025-05-12

## 2025-05-12 RX ORDER — TRETINOIN 0.5 MG/G
CREAM TOPICAL
Qty: 45 G | Refills: 3 | Status: SHIPPED | OUTPATIENT
Start: 2025-05-12

## 2025-05-12 RX ORDER — SPIRONOLACTONE 50 MG/1
50 TABLET, FILM COATED ORAL DAILY
Qty: 90 TABLET | Refills: 3 | Status: SHIPPED | OUTPATIENT
Start: 2025-05-12

## 2025-05-12 NOTE — PROGRESS NOTES
HPI:   Corrie Reeves is a 40 year old female who presents for a complete physical exam.  Last pap: done today 5/12/25  Last mammogram:  /   Menses:  Rare due to IUD  Contraception:  Mirena IUD 2023  Previous colonoscopy:  n/a  Previous DEXA:  /.  Current or previous treatment:  /  Family hx of breast, ovarian, cervical or colon CA:  Mat aunt breast  Immunizations:  Tdap:  3/2019, Flu:  yearly, Shingles:  /, COVID:  yes  Occ: Homemaker, : yes. Children: son and daughter.     Labs acceptable.     Hormonal acne: Oral bactrim in her 20s then oral doxy.  Took azelaic acid, topical clinda during pregnancy/breastfeeding.  Now controlled with spironolactone.  Using retin A regularly.  Rarely topical clinda.     Cold sores: valtrex prn.     Wt Readings from Last 6 Encounters:   05/12/25 148 lb (67.1 kg)   05/10/24 143 lb 3.2 oz (65 kg)   08/22/23 139 lb 12.8 oz (63.4 kg)   07/19/23 137 lb (62.1 kg)   07/03/23 137 lb (62.1 kg)   07/03/23 137 lb (62.1 kg)     Body mass index is 25.83 kg/m².     Cholesterol, Total (mg/dL)   Date Value   05/06/2025 169   04/22/2024 191   05/06/2023 175     HDL Cholesterol (mg/dL)   Date Value   05/06/2025 50   04/22/2024 58   05/06/2023 61 (H)     LDL Cholesterol (mg/dL)   Date Value   05/06/2025 106 (H)   04/22/2024 124 (H)   05/06/2023 105 (H)        Current Outpatient Medications   Medication Sig Dispense Refill    Loratadine (CLARITIN) 10 MG Oral Cap       Cholecalciferol (VITAMIN D) 50 MCG (2000 UT) Oral Tab Take 1 tablet by mouth in the morning.      spironolactone 50 MG Oral Tab Take 1 tablet (50 mg total) by mouth daily. 90 tablet 3    valACYclovir 1 G Oral Tab Take 1 tablet (1,000 mg total) by mouth 2 (two) times daily as needed (cold sores). 20 tablet 0    Tretinoin (RETIN-A) 0.05 % External Cream Use nightly as nightly 45 g 3    Magnesium Glycinate 100 MG Oral Cap 120 mg.      Clindamycin Phosphate 1 % External Gel Use twice daily to face as directed 45 g 3     Levonorgestrel (MIRENA, 52 MG,) 20 MCG/DAY Intrauterine IUD 20 mcg (1 each total) by Intrauterine route. 1 each 0    hydrocortisone 2.5 % External Ointment Apply 1 Application topically 2 (two) times daily. (Patient taking differently: Apply 1 Application topically as needed in the morning and 1 Application as needed in the evening.) 20 g 0      Patient Active Problem List   Diagnosis    Eczema    Acne vulgaris    Vaginal delivery (HCC)     Past Medical History:    Abnormal Pap smear of cervix    colposcopy    Abnormal uterine bleeding    Acne    H/o hormonal acne - Hormonal acne:  Previously seeing derm.  Oral bactrim in her 20s then oral doxy.  Took  azelaic acid, topical clinda during pregnancy/breastfeeding. Started spironolactone 3/2022.  Sx improved. Also using topical clinda.  Will add retin A    Acne vulgaris    Anxiety    Concussion    Eczema    Herpes simplex    cold sores    Hx of motion sickness    Screening breast examination    PCP Dr. Tess Colunga    Visual impairment    contacts/glasses      Past Surgical History:   Procedure Laterality Date    Colonoscopy  02/2010    Negative. Was having significant nausea, irregular bowel habits. Thinks it was anxiety.    Colposcopy, cervix w/upper adjacent vagina; w/biopsy(s), cervix  2012    Egd  02/2020    Negative. Done for nausea, change in bowel habits    Insert intrauterine device  07/2019 June or July 2019. Mirena IUD    Insert intrauterine device  07/19/2023    Mirena IUD inserted at time of suction D&C for incomplete SAB. Retroverted. Sounded 8 cm. Dr. Shayla Boston    Remove intrauterine device  07/03/2023    Mirena IUD removed due to early IUP conceived while IUD in correct position by ultrasound. Dr. Shayla Boston    Surg rx incomplete abortn  07/19/2023    Suction D&C for early incomplete SAB. Dr. Shayla Boston, [facility]    Tonsillectomy  1990    & adenoids      Family History   Problem Relation Age of Onset    Hyperlipidemia Mother      Other (obesity) Mother     Hyperlipidemia Father     Lipids Father     Obesity Father     Lipids Maternal Grandmother     Prostate Cancer Maternal Grandfather     Hypertension Paternal Grandmother     Obesity Paternal Grandmother     Kidney Disease Paternal Grandfather     Breast Cancer Maternal Aunt         late 60s    Ovarian Cancer Neg     Colon Cancer Neg       Social History:   Social History     Socioeconomic History    Marital status:    Tobacco Use    Smoking status: Never    Smokeless tobacco: Never   Vaping Use    Vaping status: Never Used   Substance and Sexual Activity    Alcohol use: Yes     Alcohol/week: 1.0 standard drink of alcohol     Types: 1 Standard drinks or equivalent per week     Comment: 1-2 drinks a month    Drug use: Never    Sexual activity: Yes     Partners: Male     Birth control/protection: I.U.D., Mirena     Comment: Mirena IUD inserted 7/19/23   Other Topics Concern    Caffeine Concern Yes     Comment: 1  cups    Exercise Yes     Comment: 1-2 a week    Self-Exams No        REVIEW OF SYSTEMS:   GENERAL: feels well otherwise  LUNGS: denies shortness of breath  CARDIOVASCULAR: denies chest pain  GI: denies abdominal pain,  No constipation or diarrhea  : denies dysuria, vaginal discharge or itching    EXAM:   BP 94/58   Pulse 98   Temp 98.3 °F (36.8 °C) (Oral)   Resp 16   Ht 5' 3.47\" (1.612 m)   Wt 148 lb (67.1 kg)   LMP  (LMP Unknown)   Breastfeeding No   BMI 25.83 kg/m²   Body mass index is 25.83 kg/m².   GENERAL: well developed, well nourished,in no apparent distress  HEENT: atraumatic, normocephalic, TMs normal  EYES:PERRLA, EOMI,conjunctiva are clear  NECK: supple,no adenopathy, no thyromegaly  BREAST: no dominant or suspicious mass, no nipple discharge  LUNGS: clear to auscultation  CARDIO: RRR without murmur  GI: good BS's,no masses, HSM or tenderness  : VULVA: normal appearing vulva with no masses, tenderness or lesions, VAGINA: normal appearing vagina with  normal color and discharge, no lesions, CERVIX: no erythema, no discharge noted UTERUS: uterus is normal size, shape, consistency and nontender, ADNEXA: normal adnexa in size, nontender and no masses  EXTREMITIES: no edema    ASSESSMENT AND PLAN:   Corrie Reeves is a 40 year old female who presents for a complete physical exam.  Encounter Diagnoses   Name Primary?    Routine general medical examination at a health care facility Yes    H/O cold sores     Screening for cervical cancer     Screening for human papillomavirus (HPV)      Pap: done today  Mammogram:  age 42  Dexascan:  /.  Labs:  reviewed  Colonoscopy:  age 47  Vaccines:  /  Recommended low fat diet and regular exercise.    The patient is asked to return for CPX in 1 year.  Orders Placed This Encounter   Procedures    Hpv Dna  High Risk , Thin Prep Collect    ThinPrep PAP Smear       Meds & Refills for this Visit:  Requested Prescriptions     Signed Prescriptions Disp Refills    spironolactone 50 MG Oral Tab 90 tablet 3     Sig: Take 1 tablet (50 mg total) by mouth daily.    valACYclovir 1 G Oral Tab 20 tablet 0     Sig: Take 1 tablet (1,000 mg total) by mouth 2 (two) times daily as needed (cold sores).    Tretinoin (RETIN-A) 0.05 % External Cream 45 g 3     Sig: Use nightly as nightly       Imaging & Consults:  None

## 2025-05-13 LAB — HPV E6+E7 MRNA CVX QL NAA+PROBE: NEGATIVE

## 2025-06-23 ENCOUNTER — HOSPITAL ENCOUNTER (OUTPATIENT)
Age: 40
Discharge: HOME | End: 2025-06-23
Attending: EMERGENCY MEDICINE
Payer: COMMERCIAL

## 2025-06-23 VITALS
OXYGEN SATURATION: 98 % | BODY MASS INDEX: 25.69 KG/M2 | HEART RATE: 89 BPM | RESPIRATION RATE: 20 BRPM | WEIGHT: 145 LBS | DIASTOLIC BLOOD PRESSURE: 73 MMHG | TEMPERATURE: 99 F | HEIGHT: 63 IN | SYSTOLIC BLOOD PRESSURE: 110 MMHG

## 2025-06-23 DIAGNOSIS — H00.012 HORDEOLUM EXTERNUM OF RIGHT LOWER EYELID: Primary | ICD-10-CM

## 2025-06-23 PROCEDURE — 99203 OFFICE O/P NEW LOW 30 MIN: CPT

## 2025-06-23 PROCEDURE — 99213 OFFICE O/P EST LOW 20 MIN: CPT

## 2025-06-23 RX ORDER — CEPHALEXIN 500 MG/1
500 CAPSULE ORAL 4 TIMES DAILY
Qty: 28 CAPSULE | Refills: 0 | Status: SHIPPED | OUTPATIENT
Start: 2025-06-23 | End: 2025-06-30

## 2025-06-23 NOTE — ED PROVIDER NOTES
Patient Seen in: [facility]        History  No chief complaint on file.    Stated Complaint: eye issue    Subjective:   HPI  Patient is a 40-year-old female who states she has an area on the left upper eyelid and noticed a small bump.  Patient states it has been there for a month or more.  Patient states she has no discomfort but noticed it when she touched the eyelid.  Patient states for the last 3 to 4 days she has had some swelling redness to her right lower eyelid.  Patient denies any new medications or make-ups but has been using a misting sunscreen spray.  Patient does wear contacts.  Patient denies blurry vision double vision, no drainage.  Remainder of review of systems negative.      Objective:     No pertinent past medical history.            No pertinent past surgical history.              No pertinent social history.            Review of Systems    Positive for stated complaint: eye issue  Other systems are as noted in HPI.  Constitutional and vital signs reviewed.      All other systems reviewed and negative except as noted above.                  Physical Exam    ED Triage Vitals [06/23/25 1817]   /73   Pulse 89   Resp 20   Temp 98.8 °F (37.1 °C)   Temp src Oral   SpO2 98 %   O2 Device None (Room air)       Current Vitals:   Vital Signs  BP: 110/73  Pulse: 89  Resp: 20  Temp: 98.8 °F (37.1 °C)  Temp src: Oral    Oxygen Therapy  SpO2: 98 %  O2 Device: None (Room air)      Right Eye Chart Acuity: 20/20, Corrected  Left Eye Chart Acuity: 20/20, Corrected      Physical Exam   GENERAL: Patient resting comfortably on the cart in no acute distress.  HEENT: Extraocular muscles intact, pupils equal round reactive to light and accommodation.  Mouth normal, neck supple, no meningismus.  Left upper lid small 1 mm area medial upper lid several millimeters away from the eye lid margin no tenderness or erythema or warmth.  Right lower eyelid redness swelling more so medial aspect.  No  tenderness swelling near the tear duct.  EXTREMITIES: Full range of motion, no tenderness, good capillary refill.  SKIN: No rash, good turgor.  NEURO: Patient answers questions appropriately.  No focal deficits appreciated.            ED Course  Labs Reviewed - No data to display                         MDM     Patient will be going out of town for 4 days for .  Patient is concerned about infection.  Patient will be given Keflex to cover her just in case.  I did consider stye, cellulitis.  Follow-up for further evaluation.  Return if new or worse symptoms.  Patient given ophthalmology as needed.        Medical Decision Making      Disposition and Plan     Clinical Impression:  1. Hordeolum externum of right lower eyelid         Disposition:  Discharge  2025  6:37 pm    Follow-up:  Tess Colunga MD  [address]  [facility] [address] [phone]    In 2 days      [facility]  [address] [facility] [address]  In 2 days  As needed          Medications Prescribed:  Current Discharge Medication List        START taking these medications    Details   cephALEXin 500 MG Oral Cap Take 1 capsule (500 mg total) by mouth 4 (four) times daily for 7 days.  Qty: 28 capsule, Refills: 0                   Supplementary Documentation:

## 2025-06-23 NOTE — DISCHARGE INSTRUCTIONS
Follow-up for further evaluation ophthalmology as discussed.  Return if new or worse symptoms.  Antibiotics as prescribed.  Warm compresses.

## (undated) DEVICE — SET TUBING 1/2IN COLLECT 6FT

## (undated) DEVICE — CANISTER SAFETOUCH SYST DISP

## (undated) DEVICE — CURRETTE 8MM CVD

## (undated) DEVICE — STERILE POLYISOPRENE POWDER-FREE SURGICAL GLOVES: Brand: PROTEXIS

## (undated) DEVICE — SOL NACL IRRIG 0.9% 1000ML BTL

## (undated) DEVICE — GYN CDS: Brand: MEDLINE INDUSTRIES, INC.

## (undated) DEVICE — SLEEVE KENDALL SCD EXPRESS MED